# Patient Record
Sex: FEMALE | Race: WHITE | NOT HISPANIC OR LATINO | Employment: OTHER | URBAN - METROPOLITAN AREA
[De-identification: names, ages, dates, MRNs, and addresses within clinical notes are randomized per-mention and may not be internally consistent; named-entity substitution may affect disease eponyms.]

---

## 2017-05-31 ENCOUNTER — TELEPHONE (OUTPATIENT)
Dept: ORTHOPEDIC SURGERY | Facility: CLINIC | Age: 82
End: 2017-05-31

## 2017-07-06 ENCOUNTER — OFFICE VISIT (OUTPATIENT)
Dept: ORTHOPEDIC SURGERY | Facility: CLINIC | Age: 82
End: 2017-07-06

## 2017-07-06 VITALS — BODY MASS INDEX: 18.8 KG/M2 | WEIGHT: 117 LBS | TEMPERATURE: 97.4 F | HEIGHT: 66 IN

## 2017-07-06 DIAGNOSIS — M17.11 PRIMARY OSTEOARTHRITIS OF RIGHT KNEE: ICD-10-CM

## 2017-07-06 DIAGNOSIS — M25.561 CHRONIC PAIN OF RIGHT KNEE: Primary | ICD-10-CM

## 2017-07-06 DIAGNOSIS — G89.29 CHRONIC PAIN OF RIGHT KNEE: Primary | ICD-10-CM

## 2017-07-06 PROCEDURE — 73562 X-RAY EXAM OF KNEE 3: CPT | Performed by: ORTHOPAEDIC SURGERY

## 2017-07-06 PROCEDURE — 99214 OFFICE O/P EST MOD 30 MIN: CPT | Performed by: ORTHOPAEDIC SURGERY

## 2017-07-06 RX ORDER — CLONIDINE HYDROCHLORIDE 0.2 MG/1
0.2 TABLET ORAL NIGHTLY
Refills: 0 | COMMUNITY
Start: 2017-06-12 | End: 2023-03-21 | Stop reason: SDDI

## 2017-07-06 RX ORDER — LOPERAMIDE HYDROCHLORIDE 2 MG/1
2 TABLET ORAL 4 TIMES DAILY PRN
COMMUNITY

## 2017-07-06 RX ORDER — LOSARTAN POTASSIUM 50 MG/1
TABLET ORAL
Refills: 1 | COMMUNITY
Start: 2017-06-12 | End: 2018-08-14

## 2017-07-06 RX ORDER — OXYBUTYNIN CHLORIDE 10 MG/1
TABLET, EXTENDED RELEASE ORAL
Refills: 5 | COMMUNITY
Start: 2017-06-12 | End: 2018-08-14

## 2017-07-06 NOTE — PROGRESS NOTES
"Patient: Eneida Trevino  YOB: 1933 83 y.o. female  Medical Record Number: [id number]    Chief Complaints:   Chief Complaint   Patient presents with   • Right Knee - Follow-up       History of Present Illness:Eneida Trevino is a 83 y.o. female who presents with Right knee pain and instability which she rates as severe.  She has known advanced osteoarthritis and she dealt with for years.  She is undergone physical therapy and cortisone injections hip despite this has feelings of instability and pain which limit her basic activities of daily living.  She can walk only short distances.  Of note she had a bowel resection for cancer and she does have some bowel incontinence which gives her apprehension when considering surgery.    Allergies:   Allergies   Allergen Reactions   • Penicillins Other (See Comments)     UNKNOWN   • Sulfa Antibiotics Rash       Medications:   Current Outpatient Prescriptions   Medication Sig Dispense Refill   • CloNIDine (CATAPRES) 0.2 MG tablet TK 1 T PO QD  0   • loperamide (IMODIUM A-D) 2 MG tablet Take  by mouth.     • losartan (COZAAR) 50 MG tablet TK 1 T PO QD  1   • oxybutynin XL (DITROPAN-XL) 10 MG 24 hr tablet TK 1 T PO  BID  5     No current facility-administered medications for this visit.          The following portions of the patient's history were reviewed and updated as appropriate: allergies, current medications, past family history, past medical history, past social history, past surgical history and problem list.    Review of Systems:   A 14 point review of systems was performed. All systems negative except pertinent positives/negative listed in HPI above    Physical Exam:   Vitals:    07/06/17 1625   Temp: 97.4 °F (36.3 °C)   Weight: 117 lb (53.1 kg)   Height: 65.5\" (166.4 cm)       General: A and O x 3, ASA, NAD    SCLERA:    Normal    DENTITION:   Normal  Knee:  right    ALIGNMENT:     20 deg Valgus      GAIT:    Antalgic    SKIN:    No " abnormality    RANGE OF MOTION:   5  -  110   DEG    STRENGTH:   4  / 5    LIGAMENTS:    No varus / valgus instability.   Negative  Lachman.    MENISCUS:     Negative   Norma       DISTAL PULSES:    Paplable    DISTAL SENSATION :   Intact    LYMPHATICS:     No   lymphadenopathy    OTHER:          - Positive   effusion      - Crepitance with ROM        Radiology:  Xrays 3views right knee (ap,lateral, sunrise) were ordered and reviewed for evaluation of knee pain demonstrating advanced valgus osteoarthritis with bone on bone articulation, subchondral cysts, and periarticular osteophytes.  The films were compared to films from 2015 and they are essentially unchanged    Assessment/Plan: Right knee severe end-stage osteoarthritis she has marked mechanical alignment issues and at this point the next option would be total knee replacement.  She is going to see Dr. Sukh Vogel: Surgeon to discuss options about her incontinence.  Certainly if we did proceed with surgery I would want to have her discharged home rather than rehabilitation facility and we would use a yan dressing to protect her should she have any incontinence issues.      Carlos Richardson MD  7/6/2017

## 2017-08-23 ENCOUNTER — OFFICE (OUTPATIENT)
Dept: URBAN - METROPOLITAN AREA CLINIC 75 | Facility: CLINIC | Age: 82
End: 2017-08-23

## 2017-08-23 VITALS
HEIGHT: 65 IN | HEART RATE: 78 BPM | SYSTOLIC BLOOD PRESSURE: 180 MMHG | WEIGHT: 120 LBS | DIASTOLIC BLOOD PRESSURE: 80 MMHG

## 2017-08-23 DIAGNOSIS — R63.4 ABNORMAL WEIGHT LOSS: ICD-10-CM

## 2017-08-23 DIAGNOSIS — R19.4 CHANGE IN BOWEL HABIT: ICD-10-CM

## 2017-08-23 DIAGNOSIS — R14.3 FLATULENCE: ICD-10-CM

## 2017-08-23 DIAGNOSIS — R19.7 DIARRHEA, UNSPECIFIED: ICD-10-CM

## 2017-08-23 DIAGNOSIS — D37.8 NEOPLASM OF UNCERTAIN BEHAVIOR OF OTHER SPECIFIED DIGESTIVE: ICD-10-CM

## 2017-08-23 PROCEDURE — 99214 OFFICE O/P EST MOD 30 MIN: CPT

## 2018-07-19 ENCOUNTER — OFFICE VISIT (OUTPATIENT)
Dept: ORTHOPEDIC SURGERY | Facility: CLINIC | Age: 83
End: 2018-07-19

## 2018-07-19 VITALS — BODY MASS INDEX: 21.09 KG/M2 | HEIGHT: 63 IN | WEIGHT: 119 LBS | TEMPERATURE: 98.2 F

## 2018-07-19 DIAGNOSIS — M17.11 PRIMARY OSTEOARTHRITIS OF RIGHT KNEE: ICD-10-CM

## 2018-07-19 DIAGNOSIS — M25.561 RIGHT KNEE PAIN, UNSPECIFIED CHRONICITY: Primary | ICD-10-CM

## 2018-07-19 PROCEDURE — 99214 OFFICE O/P EST MOD 30 MIN: CPT | Performed by: ORTHOPAEDIC SURGERY

## 2018-07-19 PROCEDURE — 73562 X-RAY EXAM OF KNEE 3: CPT | Performed by: ORTHOPAEDIC SURGERY

## 2018-07-19 RX ORDER — MELOXICAM 15 MG/1
15 TABLET ORAL ONCE
Status: CANCELLED | OUTPATIENT
Start: 2018-08-27 | End: 2018-08-27

## 2018-07-19 RX ORDER — SOLIFENACIN SUCCINATE 5 MG/1
TABLET, FILM COATED ORAL DAILY
COMMUNITY
End: 2018-08-14

## 2018-07-19 RX ORDER — CLINDAMYCIN PHOSPHATE 900 MG/50ML
900 INJECTION INTRAVENOUS ONCE
Status: CANCELLED | OUTPATIENT
Start: 2018-08-27 | End: 2018-08-27

## 2018-07-19 NOTE — PROGRESS NOTES
"Patient: Eneida Trevino  YOB: 1933 84 y.o. female  Medical Record Number: [id number]    Chief Complaints:   Chief Complaint   Patient presents with   • Right Knee - Follow-up, Pain       History of Present Illness:Eneida Trevino is a 84 y.o. female who presents for follow-up of  Right knee pain.  She's having more pain recently the pain is beginning to limit her activities.  She has severe advanced valgus deformity with discussed knee replacement in the past but she held off for medical reasons.  She is now in better shape and is ready to proceed with right total knee replacement    Allergies:   Allergies   Allergen Reactions   • Cephalexin Itching   • Penicillins Other (See Comments)     UNKNOWN   • Sulfa Antibiotics Rash       Medications:   Current Outpatient Prescriptions   Medication Sig Dispense Refill   • CloNIDine (CATAPRES) 0.2 MG tablet TK 1 T PO QD  0   • loperamide (IMODIUM A-D) 2 MG tablet Take  by mouth.     • losartan (COZAAR) 50 MG tablet TK 1 T PO QD  1   • Probiotic Product (ALIGN PO) Take  by mouth.     • solifenacin (VESICARE) 5 MG tablet Take  by mouth Daily.     • oxybutynin XL (DITROPAN-XL) 10 MG 24 hr tablet TK 1 T PO  BID  5     No current facility-administered medications for this visit.          The following portions of the patient's history were reviewed and updated as appropriate: allergies, current medications, past family history, past medical history, past social history, past surgical history and problem list.    Review of Systems:   A 14 point review of systems was performed. All systems negative except pertinent positives/negative listed in HPI above    Physical Exam:   Vitals:    07/19/18 1452   Temp: 98.2 °F (36.8 °C)   TempSrc: Temporal Artery    Weight: 54 kg (119 lb)   Height: 158.8 cm (62.5\")       General: A and O x 3, ASA, NAD    SCLERA:    Normal    DENTITION:   Normal  Knee:  right    ALIGNMENT:     20 degrees Valgus      GAIT:    " Antalgic    SKIN:    No abnormality    RANGE OF MOTION:   0  -  120   DEG    STRENGTH:   4  / 5    LIGAMENTS:    No varus / valgus instability.   Negative  Lachman.    MENISCUS:     Negative   Norma       DISTAL PULSES:    Paplable    DISTAL SENSATION :   Intact    LYMPHATICS:     No   lymphadenopathy    OTHER:          - Positive   effusion      - Crepitance with ROM     Radiology:  Xrays 3views right knee (ap,lateral, sunrise) were ordered and reviewed for evaluation of knee pain demonstratingadvanced valgus osteoarthritis with bone on bone articulation, subchondral cysts, and periarticular osteophytes  todays xrays were compared to previous xrays and demonstrate no change    Assessment/Plan:  Severe advanced right knee valgus osteoarthritis.  She has addressed her bowel issues and overall feels that she is ready to proceed with knee replacement as we have discussed in the past.  She is a and extreme fixed valgus deformity which could require revision components or possibly an hinge components which would be available for the procedure.  Continuation of conservative management vs. TKA discussed.  The patient wishes to proceed with total knee replacement.  At this point the patient has failed the full compliment of conservative treatment and stating complete understanding of the risks/benefits/ anternatives wishes to proceed with surgical treatment.    Risk and benefits of surgery were reviewed.  Including, but not limited to, blood clots or pulmonary embolism, anesthesia risk, infection, fracture, skin/leg numbness, persistent pain/crepitance/popping/catching, failure of the implant, need for future surgeries, hematoma, possible nerve or blood vessel injury, need for transfusion, and potential risk of stroke,heart attack or death, among others.  The patient understands and wishes to proceed.     It was explained that if tissue has been repaired or reconstructed, there is also an increased chance of failure  which may require further management.  Following the completion of the discussion, the patient expressed understanding of this planned course of care, all their questions were answered and consent will be obtained preoperatively.    Operative Plan: right knee [facility] Total Knee Replacement a 3 day staywith inpatient rehab  - [facility]

## 2018-07-25 ENCOUNTER — HOSPITAL ENCOUNTER (OUTPATIENT)
Dept: PHYSICAL THERAPY | Facility: HOSPITAL | Age: 83
Setting detail: THERAPIES SERIES
Discharge: HOME | End: 2018-07-25
Attending: ORTHOPAEDIC SURGERY

## 2018-07-25 DIAGNOSIS — M25.561 ACUTE PAIN OF RIGHT KNEE: Primary | ICD-10-CM

## 2018-07-25 PROCEDURE — G8979 MOBILITY GOAL STATUS: HCPCS | Performed by: PHYSICAL THERAPIST

## 2018-07-25 PROCEDURE — 97110 THERAPEUTIC EXERCISES: CPT | Performed by: PHYSICAL THERAPIST

## 2018-07-25 PROCEDURE — G8978 MOBILITY CURRENT STATUS: HCPCS | Performed by: PHYSICAL THERAPIST

## 2018-07-25 PROCEDURE — 97162 PT EVAL MOD COMPLEX 30 MIN: CPT | Performed by: PHYSICAL THERAPIST

## 2018-07-26 NOTE — THERAPY EVALUATION
Outpatient Physical Therapy Ortho Initial Evaluation  [facility]     Patient Name: Eneida Trevino  : 1933  MRN: [id number]  Today's Date: 2018      Visit Date: 2018    There is no problem list on file for this patient.       Past Medical History:   Diagnosis Date   • Carcinoid tumor of gastrointestinal tract         Past Surgical History:   Procedure Laterality Date   • FOOT SURGERY Bilateral    • HYSTERECTOMY     • KIDNEY STONE SURGERY         Visit Dx:     ICD-10-CM ICD-9-CM   1. Acute pain of right knee M25.561 719.46             Patient History     Row Name 18 07             History    Chief Complaint Difficulty Walking;Difficulty with daily activities  -      Date Current Problem(s) Began 18  -      Brief Description of Current Complaint 83 y/o female with progressive hx of (R) OA knee resulting in 5/ 10 pain poor tolerance with prolonged ambulation > 10 mins  -         Pain     Pain Location --   (R) knee pain 5/ 10  -      Tolerance Time- Standing 10 mins  -      Tolerance Time- Walking 10 mins  -        User Key  (r) = Recorded By, (t) = Taken By, (c) = Cosigned By    Initials Name Provider Type     Stan Velasquez, PT Physical Therapist                PT Ortho     Row Name 18 07       Subjective Comments    Subjective Comments --   difficutly with ADL's  -       Subjective Pain    Able to rate subjective pain? yes  -    Pre-Treatment Pain Level 5  -DH       Posture/Observations    Posture/Observations Comments --   (+) valgus deformity > 20 degrees (R) knee  -       Quarter Clearing    Quarter Clearing --   all dermatomes are intact  -       Special Tests/Palpation    Special Tests/Palpation --   severe ttp (R) ant/ med joint line  -       General ROM    GENERAL ROM COMMENTS --   (R) knee AAROM 0/0/ 125  -       MMT (Manual Muscle Testing)    Additional Documentation --   (R) quad 4-/5 (B) glute medius 4-/5  -DH        Transfers    Comment (Transfers) --   pain with sit-stand   -       Gait/Stairs Assessment/Training    Pattern (Gait) --   (+) antalgia with gait/ encouraged use of spc  -      User Key  (r) = Recorded By, (t) = Taken By, (c) = Cosigned By    Initials Name Provider Type     Stan Velasquez, PT Physical Therapist                 Pt is a 83 y/o female presenting to PT with s/s consistent with progressively acute (R) knee OA/ pain;  (+) valgus deformity is noted with gait.  encouraged use of spc;  (R) knee AAROM 0/0/ 125;severe ttp (R) ant/ med joint line knee; decreased (R) quad/ (B) glute medius MMT 4-/5;  10 min ambulation tolerance. 72% LE disability score.  pt is a good candidate for skilled PT prognosis for this patient is good.                    PT OP Goals     Row Name 07/26/18 0700          PT Short Term Goals    STG Date to Achieve 08/08/18  -     STG 1 pt to be indepedent [facility] prehabilitation program. 2 wks.   -     STG 2 decreased ant/ med joint line ttp from severe to minimal to alllow for increased ease with sit-stand transfer. 2 wks.   -        Long Term Goals    LTG 1 pt to demonstrate correct gait sequence with use of spc. 4 wks.   -     LTG 2 increased LE MMT 4/5 to allow for increased tolerance with prolonged ambulation > 20 mins without increased symptoms of knee pain > 3/ 10 consistently. 4 wks.   -        Time Calculation    PT Goal Re-Cert Due Date 08/25/18  -       User Key  (r) = Recorded By, (t) = Taken By, (c) = Cosigned By    Initials Name Provider Type     Stan Velasquez, PT Physical Therapist                      Exercises     Row Name 07/26/18 0700             Subjective Comments    Subjective Comments --   difficutly with ADL's  -         Subjective Pain    Able to rate subjective pain? yes  -      Pre-Treatment Pain Level 5  -         Total Minutes    43571 - PT Therapeutic Exercise Minutes 20  -DH         Exercise 1    Exercise Name 1 see flow sheet  -       Cueing 1 Verbal;Tactile  -        User Key  (r) = Recorded By, (t) = Taken By, (c) = Cosigned By    Initials Name Provider Type     Stan Velasquez, PT Physical Therapist                        Outcome Measure Options:  (LE fx score 74%)         Time Calculation:     Therapy Suggested Charges     Code   Minutes Charges    29487 (CPT®) Hc Pt Neuromusc Re Education Ea 15 Min      90168 (CPT®) Hc Pt Ther Proc Ea 15 Min 20 1    26166 (CPT®) Hc Gait Training Ea 15 Min      31299 (CPT®) Hc Pt Therapeutic Act Ea 15 Min      21885 (CPT®) Hc Pt Manual Therapy Ea 15 Min      81890 (CPT®) Hc Pt Ther Massage- Per 15 Min      41353 (CPT®) Hc Pt Iontophoresis Ea 15 Min      05855 (CPT®) Hc Pt Elec Stim Ea-Per 15 Min      75618 (CPT®) Hc Pt Ultrasound Ea 15 Min      58664 (CPT®) Hc Pt Self Care/Mgmt/Train Ea 15 Min      Total  20 1          Start Time: 1015  Stop Time: 1100  Time Calculation (min): 45 min     Therapy Charges for Today     Code Description Service Date Service Provider Modifiers Qty    24509096710 HC PT MOBILITY CURRENT 7/25/2018 Stan Velasquez, PT GP, CL 1    18837855929 HC PT MOBILITY PROJECTED 7/25/2018 Stan Velasquez, PT GP, CK 1    16867940105 HC PT THER PROC EA 15 MIN 7/25/2018 Stan Velasquez, PT GP 1    13661428656 HC PT EVAL MOD COMPLEXITY 2 7/25/2018 Stan Velasquez, PT GP 1          PT G-Codes  Outcome Measure Options:  (LE fx score 74%)  Score: LE fx score 74%  Functional Limitation: Mobility: Walking and moving around  Mobility: Walking and Moving Around Current Status (): At least 60 percent but less than 80 percent impaired, limited or restricted  Mobility: Walking and Moving Around Goal Status (): At least 40 percent but less than 60 percent impaired, limited or restricted         Stan Velasquez, PT  7/26/2018

## 2018-07-27 ENCOUNTER — HOSPITAL ENCOUNTER (OUTPATIENT)
Dept: PHYSICAL THERAPY | Facility: HOSPITAL | Age: 83
Setting detail: THERAPIES SERIES
Discharge: HOME | End: 2018-07-27

## 2018-07-27 PROCEDURE — 97110 THERAPEUTIC EXERCISES: CPT | Performed by: PHYSICAL THERAPIST

## 2018-07-27 NOTE — THERAPY TREATMENT NOTE
Outpatient Physical Therapy Ortho Treatment Note  [facility]     Patient Name: Eneida Trevino  : 1933  MRN: [id number]  Today's Date: 2018      Visit Date: 2018    Visit Dx:  No diagnosis found.    There is no problem list on file for this patient.       Past Medical History:   Diagnosis Date   • Carcinoid tumor of gastrointestinal tract         Past Surgical History:   Procedure Laterality Date   • FOOT SURGERY Bilateral    • HYSTERECTOMY     • KIDNEY STONE SURGERY               PT Ortho     Row Name 18 0700       Subjective Comments    Subjective Comments --   difficutly with ADL's  -       Subjective Pain    Able to rate subjective pain? yes  -    Pre-Treatment Pain Level 5  -       Posture/Observations    Posture/Observations Comments --   (+) valgus deformity > 20 degrees (R) knee  -       Quarter Clearing    Quarter Clearing --   all dermatomes are intact  -       Special Tests/Palpation    Special Tests/Palpation --   severe ttp (R) ant/ med joint line  -       General ROM    GENERAL ROM COMMENTS --   (R) knee AAROM 0/0/ 125  -       MMT (Manual Muscle Testing)    Additional Documentation --   (R) quad 4-/5 (B) glute medius 4-/5  -       Transfers    Comment (Transfers) --   pain with sit-stand   -       Gait/Stairs Assessment/Training    Pattern (Gait) --   (+) antalgia with gait/ encouraged use of spc  -      User Key  (r) = Recorded By, (t) = Taken By, (c) = Cosigned By    Initials Name Provider Type     Stan Velasquez, PT Physical Therapist                            PT Assessment/Plan     Row Name 18 1112          PT Assessment    Assessment Comments reviewed progressed HEP;  mild cuing with HEP; no pain with AAROM/ AROM/ LE strengthening;  encouraged with tolerance.  provided independent with HEP dc next visit.   -     Please refer to paper survey for additional self-reported information Yes  -     Rehab Potential Good  -      Patient/caregiver participated in establishment of treatment plan and goals Yes  -DH       User Key  (r) = Recorded By, (t) = Taken By, (c) = Cosigned By    Initials Name Provider Type    LINDSEY Velasquez PT Physical Therapist                    Exercises     Row Name 07/27/18 1100             Subjective Comments    Subjective Comments questions about HEP  -DH         Subjective Pain    Able to rate subjective pain? yes  -DH      Pre-Treatment Pain Level 4  -DH      Post-Treatment Pain Level 3  -DH         Total Minutes    25432 - PT Therapeutic Exercise Minutes 40  -DH         Exercise 1    Exercise Name 1 see flow sheet  -DH      Cueing 1 Verbal;Tactile  -DH        User Key  (r) = Recorded By, (t) = Taken By, (c) = Cosigned By    Initials Name Provider Type    LINDSEY Velasquez PT Physical Therapist                                            Time Calculation:   Start Time: 0930  Stop Time: 1015  Time Calculation (min): 45 min  Therapy Suggested Charges     Code   Minutes Charges    64028 (CPT®) Hc Pt Neuromusc Re Education Ea 15 Min      27394 (CPT®) Hc Pt Ther Proc Ea 15 Min 40 3    14214 (CPT®) Hc Gait Training Ea 15 Min      95666 (CPT®) Hc Pt Therapeutic Act Ea 15 Min      54628 (CPT®) Hc Pt Manual Therapy Ea 15 Min      16859 (CPT®) Hc Pt Ther Massage- Per 15 Min      24164 (CPT®) Hc Pt Iontophoresis Ea 15 Min      27458 (CPT®) Hc Pt Elec Stim Ea-Per 15 Min      02000 (CPT®) Hc Pt Ultrasound Ea 15 Min      54407 (CPT®) Hc Pt Self Care/Mgmt/Train Ea 15 Min      Total  40 3        Therapy Charges for Today     Code Description Service Date Service Provider Modifiers Qty    74516208651 HC PT THER PROC EA 15 MIN 7/27/2018 Stan Velasquez, PT GP 3                    Stan Velasquez, PT  7/27/2018

## 2018-07-31 PROBLEM — M17.11 PRIMARY OSTEOARTHRITIS OF RIGHT KNEE: Status: ACTIVE | Noted: 2018-07-31

## 2018-08-01 ENCOUNTER — HOSPITAL ENCOUNTER (OUTPATIENT)
Dept: PHYSICAL THERAPY | Facility: HOSPITAL | Age: 83
Setting detail: THERAPIES SERIES
Discharge: HOME | End: 2018-08-01

## 2018-08-01 DIAGNOSIS — M25.561 ACUTE PAIN OF RIGHT KNEE: Primary | ICD-10-CM

## 2018-08-01 PROCEDURE — G8980 MOBILITY D/C STATUS: HCPCS | Performed by: PHYSICAL THERAPIST

## 2018-08-01 PROCEDURE — G8979 MOBILITY GOAL STATUS: HCPCS | Performed by: PHYSICAL THERAPIST

## 2018-08-02 PROCEDURE — 97110 THERAPEUTIC EXERCISES: CPT | Performed by: PHYSICAL THERAPIST

## 2018-08-02 NOTE — THERAPY DISCHARGE NOTE
Outpatient Physical Therapy Ortho Treatment Note/Discharge Summary  [facility]     Patient Name: Eneida Trevino  : 1933  MRN: [id number]  Today's Date: 2018      Visit Date: 2018    Visit Dx:    ICD-10-CM ICD-9-CM   1. Acute pain of right knee M25.561 719.46       Patient Active Problem List   Diagnosis   • Primary osteoarthritis of right knee        Past Medical History:   Diagnosis Date   • Carcinoid tumor of gastrointestinal tract         Past Surgical History:   Procedure Laterality Date   • FOOT SURGERY Bilateral    • HYSTERECTOMY     • KIDNEY STONE SURGERY                  all goals met/ pt independent with progressed HEP;  values PT compliance with HEP;  appropriate for discharge at this time. pt agrees with POC and is satisfied with all intervention provided.               PT Assessment/Plan     Row Name 18 0748          PT Assessment    Assessment Comments all goals met/ pt independent with progressed HEP;  values PT compliance with HEP;  appropriate for discharge at this time. pt agrees with POC and is satisfied with all intervention provided.   -     Please refer to paper survey for additional self-reported information Yes  -     Rehab Potential Good  -     Patient/caregiver participated in establishment of treatment plan and goals Yes  -     Patient would benefit from skilled therapy intervention Yes  -       User Key  (r) = Recorded By, (t) = Taken By, (c) = Cosigned By    Initials Name Provider Type    Stan Knight PT Physical Therapist                    Exercises     Row Name 18 0700             Subjective Comments    Subjective Comments --   questions about HEP/ POC  -         Subjective Pain    Able to rate subjective pain? yes  -DH      Pre-Treatment Pain Level 1  -DH      Post-Treatment Pain Level 1  -DH         Total Minutes    64631 - PT Therapeutic Exercise Minutes 40  -DH         Exercise 1    Exercise Name 1 reassessment/ edu  POC/ HEP  -        User Key  (r) = Recorded By, (t) = Taken By, (c) = Cosigned By    Initials Name Provider Type     Stan Velasquez, PT Physical Therapist                               PT OP Goals     Row Name 08/02/18 0700          PT Short Term Goals    STG Date to Achieve 08/08/18  -     STG 1 pt to be indepedent [facility] prehabilitation program. 2 wks.   -     STG 1 Progress Met  -     STG 2 decreased ant/ med joint line ttp from severe to minimal to alllow for increased ease with sit-stand transfer. 2 wks.   -     STG 2 Progress Met  -        Long Term Goals    LTG 1 pt to demonstrate correct gait sequence with use of spc. 4 wks.   -     LTG 1 Progress Met  -     LTG 2 increased LE MMT 4/5 to allow for increased tolerance with prolonged ambulation > 20 mins without increased symptoms of knee pain > 3/ 10 consistently. 4 wks.   -     LTG 2 Progress Ongoing  -       User Key  (r) = Recorded By, (t) = Taken By, (c) = Cosigned By    Initials Name Provider Type     Stan Velasquez, PT Physical Therapist                         Time Calculation:   Start Time: 1500  Stop Time: 1545  Time Calculation (min): 45 min  Therapy Suggested Charges     Code   Minutes Charges    14484 (CPT®) Hc Pt Neuromusc Re Education Ea 15 Min      87427 (CPT®) Hc Pt Ther Proc Ea 15 Min 40 3    82082 (CPT®) Hc Gait Training Ea 15 Min      26191 (CPT®) Hc Pt Therapeutic Act Ea 15 Min      63993 (CPT®) Hc Pt Manual Therapy Ea 15 Min      10727 (CPT®) Hc Pt Ther Massage- Per 15 Min      95411 (CPT®) Hc Pt Iontophoresis Ea 15 Min      38245 (CPT®) Hc Pt Elec Stim Ea-Per 15 Min      06200 (CPT®) Hc Pt Ultrasound Ea 15 Min      08196 (CPT®) Hc Pt Self Care/Mgmt/Train Ea 15 Min      Total  40 3        Therapy Charges for Today     Code Description Service Date Service Provider Modifiers Qty    09987392849 HC PT MOBILITY PROJECTED 8/1/2018 Stan Velasquez, PT GP, CK 1    11014714642 HC PT MOBILITY DISCHARGE 8/1/2018 Ron  Stan LEAL, PT GP, CK 1    [id number]  PT THER PROC EA 15 MIN 8/2/2018 Stan Velasquez, PT GP 3          PT G-Codes  PT Professional Judgement Used?: Yes  Functional Limitation: Mobility: Walking and moving around  Mobility: Walking and Moving Around Goal Status (): At least 40 percent but less than 60 percent impaired, limited or restricted  Mobility: Walking and Moving Around Discharge Status (): At least 40 percent but less than 60 percent impaired, limited or restricted            Stan Velasquez, PT  8/2/2018

## 2018-08-10 ENCOUNTER — PREP FOR SURGERY (OUTPATIENT)
Dept: OTHER | Facility: HOSPITAL | Age: 83
End: 2018-08-10

## 2018-08-10 DIAGNOSIS — M17.11 PRIMARY OSTEOARTHRITIS OF RIGHT KNEE: Primary | ICD-10-CM

## 2018-08-14 ENCOUNTER — TELEPHONE (OUTPATIENT)
Dept: ORTHOPEDIC SURGERY | Facility: CLINIC | Age: 83
End: 2018-08-14

## 2018-08-14 ENCOUNTER — APPOINTMENT (OUTPATIENT)
Dept: PREADMISSION TESTING | Facility: HOSPITAL | Age: 83
End: 2018-08-14

## 2018-08-14 VITALS
DIASTOLIC BLOOD PRESSURE: 74 MMHG | SYSTOLIC BLOOD PRESSURE: 156 MMHG | BODY MASS INDEX: 20.98 KG/M2 | OXYGEN SATURATION: 99 % | WEIGHT: 118.4 LBS | RESPIRATION RATE: 16 BRPM | HEIGHT: 63 IN | TEMPERATURE: 97.7 F | HEART RATE: 63 BPM

## 2018-08-14 DIAGNOSIS — N39.0 ACUTE UTI (URINARY TRACT INFECTION): Primary | ICD-10-CM

## 2018-08-14 DIAGNOSIS — M17.11 PRIMARY OSTEOARTHRITIS OF RIGHT KNEE: ICD-10-CM

## 2018-08-14 LAB
ANION GAP SERPL CALCULATED.3IONS-SCNC: 9.6 MMOL/L
BACTERIA UR QL AUTO: ABNORMAL /HPF
BILIRUB UR QL STRIP: NEGATIVE
BUN BLD-MCNC: 11 MG/DL (ref 8–23)
BUN/CREAT SERPL: 21.2 (ref 7–25)
CALCIUM SPEC-SCNC: 9.1 MG/DL (ref 8.6–10.5)
CHLORIDE SERPL-SCNC: 106 MMOL/L (ref 98–107)
CLARITY UR: CLEAR
CO2 SERPL-SCNC: 27.4 MMOL/L (ref 22–29)
COLOR UR: YELLOW
CREAT BLD-MCNC: 0.52 MG/DL (ref 0.57–1)
DEPRECATED RDW RBC AUTO: 45.1 FL (ref 37–54)
ERYTHROCYTE [DISTWIDTH] IN BLOOD BY AUTOMATED COUNT: 13.8 % (ref 11.7–13)
GFR SERPL CREATININE-BSD FRML MDRD: 112 ML/MIN/1.73
GLUCOSE BLD-MCNC: 79 MG/DL (ref 65–99)
GLUCOSE UR STRIP-MCNC: NEGATIVE MG/DL
HCT VFR BLD AUTO: 39.2 % (ref 35.6–45.5)
HGB BLD-MCNC: 12.7 G/DL (ref 11.9–15.5)
HGB UR QL STRIP.AUTO: NEGATIVE
HYALINE CASTS UR QL AUTO: ABNORMAL /LPF
KETONES UR QL STRIP: NEGATIVE
LEUKOCYTE ESTERASE UR QL STRIP.AUTO: ABNORMAL
MCH RBC QN AUTO: 28.7 PG (ref 26.9–32)
MCHC RBC AUTO-ENTMCNC: 32.4 G/DL (ref 32.4–36.3)
MCV RBC AUTO: 88.7 FL (ref 80.5–98.2)
NITRITE UR QL STRIP: POSITIVE
PH UR STRIP.AUTO: 6 [PH] (ref 5–8)
PLATELET # BLD AUTO: 200 10*3/MM3 (ref 140–500)
PMV BLD AUTO: 9.5 FL (ref 6–12)
POTASSIUM BLD-SCNC: 3.9 MMOL/L (ref 3.5–5.2)
PROT UR QL STRIP: NEGATIVE
RBC # BLD AUTO: 4.42 10*6/MM3 (ref 3.9–5.2)
RBC # UR: ABNORMAL /HPF
REF LAB TEST METHOD: ABNORMAL
SODIUM BLD-SCNC: 143 MMOL/L (ref 136–145)
SP GR UR STRIP: 1.01 (ref 1–1.03)
SQUAMOUS #/AREA URNS HPF: ABNORMAL /HPF
UROBILINOGEN UR QL STRIP: ABNORMAL
WBC NRBC COR # BLD: 4.77 10*3/MM3 (ref 4.5–10.7)
WBC UR QL AUTO: ABNORMAL /HPF

## 2018-08-14 PROCEDURE — 85027 COMPLETE CBC AUTOMATED: CPT | Performed by: ORTHOPAEDIC SURGERY

## 2018-08-14 PROCEDURE — 81001 URINALYSIS AUTO W/SCOPE: CPT | Performed by: ORTHOPAEDIC SURGERY

## 2018-08-14 PROCEDURE — 80048 BASIC METABOLIC PNL TOTAL CA: CPT | Performed by: ORTHOPAEDIC SURGERY

## 2018-08-14 PROCEDURE — 36415 COLL VENOUS BLD VENIPUNCTURE: CPT

## 2018-08-14 PROCEDURE — 87077 CULTURE AEROBIC IDENTIFY: CPT | Performed by: ORTHOPAEDIC SURGERY

## 2018-08-14 PROCEDURE — 87186 SC STD MICRODIL/AGAR DIL: CPT | Performed by: ORTHOPAEDIC SURGERY

## 2018-08-14 PROCEDURE — 87086 URINE CULTURE/COLONY COUNT: CPT | Performed by: ORTHOPAEDIC SURGERY

## 2018-08-14 PROCEDURE — 93005 ELECTROCARDIOGRAM TRACING: CPT

## 2018-08-14 PROCEDURE — 93010 ELECTROCARDIOGRAM REPORT: CPT | Performed by: INTERNAL MEDICINE

## 2018-08-14 RX ORDER — CHLORHEXIDINE GLUCONATE 500 MG/1
CLOTH TOPICAL
COMMUNITY
End: 2023-03-21 | Stop reason: SDDI

## 2018-08-14 RX ORDER — OXYBUTYNIN CHLORIDE 5 MG/1
5 TABLET ORAL AS NEEDED
COMMUNITY
End: 2023-03-21 | Stop reason: SDDI

## 2018-08-14 RX ORDER — LOSARTAN POTASSIUM 50 MG/1
100 TABLET ORAL DAILY
COMMUNITY

## 2018-08-14 RX ORDER — CIPROFLOXACIN 500 MG/1
500 TABLET, FILM COATED ORAL EVERY 12 HOURS SCHEDULED
Qty: 14 TABLET | Refills: 0 | Status: SHIPPED | OUTPATIENT
Start: 2018-08-14

## 2018-08-14 RX ORDER — CLONIDINE HYDROCHLORIDE 0.2 MG/1
0.2 TABLET ORAL NIGHTLY
COMMUNITY

## 2018-08-14 ASSESSMENT — KOOS JR
KOOS JR SCORE: 13
KOOS JR SCORE: 54.84

## 2018-08-14 NOTE — TELEPHONE ENCOUNTER
----- Message from SUZY Godoy sent at 8/14/2018  4:11 PM EDT -----  Would not recommend that we wait for sensitivities before treating.  Delaying treatment would put the patient at risk for developing pylonephritis.  Please ask that the patient get started with antibiotic as soon as possible and if we need to change antibiotic we always can once sensitivities are back

## 2018-08-14 NOTE — DISCHARGE INSTRUCTIONS
Take the following medications the morning of surgery with a small sip of water:    LOSARTAN    General Instructions:  • Do not eat solid food after midnight the night before surgery.  • You may drink clear liquids day of surgery but must stop at least one hour before your hospital arrival time.  • It is beneficial for you to have a clear drink that contains carbohydrates the day of surgery.  We suggest a 12 to 20 ounce bottle of Gatorade or Powerade for non-diabetic patients or a 12 to 20 ounce bottle of G2 or Powerade Zero for diabetic patients. (Pediatric patients, are not advised to drink a 12 to 20 ounce carbohydrate drink)    Clear liquids are liquids you can see through.  Nothing red in color.     Plain water                               Sports drinks  Sodas                                   Gelatin (Jell-O)  Fruit juices without pulp such as white grape juice and apple juice  Popsicles that contain no fruit or yogurt  Tea or coffee (no cream or milk added)  Gatorade / Powerade  G2 / Powerade Zero    • Infants may have breast milk up to four hours before surgery.  • Infants drinking formula may drink formula up to six hours before surgery.   • Patients who avoid smoking, chewing tobacco and alcohol for 4 weeks prior to surgery have a reduced risk of post-operative complications.  Quit smoking as many days before surgery as you can.  • Do not smoke, use chewing tobacco or drink alcohol the day of surgery.   • If applicable bring your C-PAP/ BI-PAP machine.  • Bring any papers given to you in the doctor’s office.  • Wear clean comfortable clothes and socks.  • Do not wear contact lenses or make-up.  Bring a case for your glasses.   • Bring crutches or walker if applicable.  • Remove all piercings.  Leave jewelry and any other valuables at home.  • Hair extensions with metal clips must be removed prior to surgery.  • The Pre-Admission Testing nurse will instruct you to bring medications if unable to obtain an  accurate list in Pre-Admission Testing.        If you were given a blood bank ID arm band remember to bring it with you the day of surgery.    Preventing a Surgical Site Infection:  • For 2 to 3 days before surgery, avoid shaving with a razor because the razor can irritate skin and make it easier to develop an infection.    • Any areas of open skin can increase the risk of a post-operative wound infection by allowing bacteria to enter and travel throughout the body.  Notify your surgeon if you have any skin wounds / rashes even if it is not near the expected surgical site.  The area will need assessed to determine if surgery should be delayed until it is healed.  • The night prior to surgery sleep in a clean bed with clean clothing.  Do not allow pets to sleep with you.  • Shower on the morning of surgery using a fresh bar of anti-bacterial soap (such as Dial) and clean washcloth.  Dry with a clean towel and dress in clean clothing.  • Ask your surgeon if you will be receiving antibiotics prior to surgery.  • Make sure you, your family, and all healthcare providers clean their hands with soap and water or an alcohol based hand  before caring for you or your wound.    Day of surgery:  Upon arrival, a Pre-op nurse and Anesthesiologist will review your health history, obtain vital signs, and answer questions you may have.  The only belongings needed at this time will be your home medications and if applicable your C-PAP/BI-PAP machine.  If you are staying overnight your family can leave the rest of your belongings in the car and bring them to your room later.  A Pre-op nurse will start an IV and you may receive medication in preparation for surgery, including something to help you relax.  Your family will be able to see you in the Pre-op area.  While you are in surgery your family should notify the waiting room  if they leave the waiting room area and provide a contact phone number.    Please be  aware that surgery does come with discomfort.  We want to make every effort to control your discomfort so please discuss any uncontrolled symptoms with your nurse.   Your doctor will most likely have prescribed pain medications.      If you are going home after surgery you will receive individualized written care instructions before being discharged.  A responsible adult must drive you to and from the hospital on the day of your surgery and stay with you for 24 hours.    If you are staying overnight following surgery, you will be transported to your hospital room following the recovery period.  [facility]    You have received a list of surgical assistants for your reference.  If you have any questions please call Pre-Admission Testing at [phone].  Deductibles and co-payments are collected on the day of service. Please be prepared to pay the required co-pay, deductible or deposit on the day of service as defined by your plan.    2% CHLORAHEXIDINE GLUCONATE* CLOTH  Preparing or “prepping” skin before surgery can reduce the risk of infection at the surgical site. To make the process easier, [facility] has chosen disposable cloths moistened with a rinse-free, 2% Chlorhexidine Gluconate (CHG) antiseptic solution. The steps below outline the prepping process and should be carefully followed.        Use the prep cloth on the area that is circled in the diagram             Directions Night before Surgery  1) Shower using a fresh bar of anti-bacterial soap (such as Dial) and clean washcloth.  Use a clean towel to completely dry your skin.  2) Do not use any lotions, oils or creams on your skin.  3) Open the package and remove 1 cloth, wipe your skin for 30 seconds in a circular motion.  Allow to dry for 3 minutes.  4) Repeat #3 with second cloth.  5) Do not touch your eyes, ears, or mouth with the prep cloth.  6) Allow the wet prep solution to air dry.  7) Discard the  prep cloth and wash your hands with soap and water.   8) Dress in clean bed clothes and sleep on fresh clean bed sheets.   9) You may experience some temporary itching after the prep.    Directions Day of Surgery  1) Repeat steps 1,2,3,4,5,6,7, and 9.   2) Dress in clean clothes before coming to the hospital.    BACTROBAN NASAL OINTMENT  There are many germs normally in your nose. Bactroban is an ointment that will help reduce these germs. Please follow these instructions for Bactroban use:      ____The day before surgery in the morning  Date________    ____The day before surgery in the evening              Date________    ____The day of surgery in the morning    Date________    **Squirt ½ package of Bactroban Ointment onto a cotton applicator and apply to inside of 1st nostril.  Squirt the remaining Bactroban and apply to the inside of the other nostril.      .

## 2018-08-14 NOTE — TELEPHONE ENCOUNTER
In review of her preadmission testing patient has an abnormal urinalysis.  Urine is still pending.  Have E prescribed a new prescription to [facility] at [phone] for Cipro 500 mg, #14, directions her to take 1 by mouth twice a day per mL L.  I was unable to reach the patient however I did leave her a message on her answering machine with all this information

## 2018-08-16 LAB — BACTERIA SPEC AEROBE CULT: ABNORMAL

## 2019-02-06 ENCOUNTER — DOCUMENTATION (OUTPATIENT)
Dept: PHYSICAL THERAPY | Facility: HOSPITAL | Age: 84
End: 2019-02-06

## 2021-03-02 DIAGNOSIS — Z23 IMMUNIZATION DUE: ICD-10-CM

## 2022-03-29 ENCOUNTER — HOME HEALTH ADMISSION (OUTPATIENT)
Dept: HOME HEALTH SERVICES | Facility: HOME HEALTHCARE | Age: 87
End: 2022-03-29

## 2022-03-29 ENCOUNTER — OFFICE VISIT (OUTPATIENT)
Dept: ORTHOPEDIC SURGERY | Facility: CLINIC | Age: 87
End: 2022-03-29

## 2022-03-29 VITALS — BODY MASS INDEX: 20.49 KG/M2 | WEIGHT: 120 LBS | HEIGHT: 64 IN | TEMPERATURE: 97.1 F

## 2022-03-29 DIAGNOSIS — M17.11 PRIMARY OSTEOARTHRITIS OF RIGHT KNEE: Primary | ICD-10-CM

## 2022-03-29 PROCEDURE — 73562 X-RAY EXAM OF KNEE 3: CPT | Performed by: NURSE PRACTITIONER

## 2022-03-29 PROCEDURE — 20610 DRAIN/INJ JOINT/BURSA W/O US: CPT | Performed by: NURSE PRACTITIONER

## 2022-03-29 PROCEDURE — 99203 OFFICE O/P NEW LOW 30 MIN: CPT | Performed by: NURSE PRACTITIONER

## 2022-03-29 RX ORDER — METHYLPREDNISOLONE ACETATE 80 MG/ML
80 INJECTION, SUSPENSION INTRA-ARTICULAR; INTRALESIONAL; INTRAMUSCULAR; SOFT TISSUE
Status: COMPLETED | OUTPATIENT
Start: 2022-03-29 | End: 2022-03-29

## 2022-03-29 RX ORDER — LIDOCAINE HYDROCHLORIDE 20 MG/ML
2 INJECTION, SOLUTION EPIDURAL; INFILTRATION; INTRACAUDAL; PERINEURAL
Status: COMPLETED | OUTPATIENT
Start: 2022-03-29 | End: 2022-03-29

## 2022-03-29 RX ADMIN — LIDOCAINE HYDROCHLORIDE 2 ML: 20 INJECTION, SOLUTION EPIDURAL; INFILTRATION; INTRACAUDAL; PERINEURAL at 14:21

## 2022-03-29 RX ADMIN — METHYLPREDNISOLONE ACETATE 80 MG: 80 INJECTION, SUSPENSION INTRA-ARTICULAR; INTRALESIONAL; INTRAMUSCULAR; SOFT TISSUE at 14:21

## 2022-03-29 NOTE — PROGRESS NOTES
Patient: Eneida Trevino  YOB: 1933 88 y.o. female  Medical Record Number: [id number]    Chief Complaints:   Chief Complaint   Patient presents with   • Right Knee - Initial Evaluation, Pain       History of Present Illness:Eneida Trevino is a 88 y.o. female who presents as a new patient both myself as well as to the practice with complaints of right knee pain.  She was actually seen by Dr. Richardson but is been well over 3 years.  She has significant deformity of the right knee secondary to end-stage osteoarthritis and even years ago she spoke with Dr. Richardson and they decided not to proceed with surgery because of the severe misalignment of her knee.  She is here today to see if there are any additional options.    Allergies:   Allergies   Allergen Reactions   • Cephalexin Itching   • Penicillins Other (See Comments)     UNKNOWN   • Sulfa Antibiotics Rash       Medications:   Current Outpatient Medications   Medication Sig Dispense Refill   • Chlorhexidine Gluconate Cloth 2 % pads Apply  topically. PER MD INSTRUCTIONS     • ciprofloxacin (CIPRO) 500 MG tablet Take 1 tablet by mouth Every 12 (Twelve) Hours. 14 tablet 0   • CloNIDine (CATAPRES) 0.2 MG tablet TK 1 T PO QD  0   • CloNIDine (CATAPRES) 0.2 MG tablet Take 0.2 mg by mouth Every Night. TAKES 2 TABLETS     • loperamide (IMODIUM A-D) 2 MG tablet Take 2 mg by mouth 4 (Four) Times a Day As Needed.     • losartan (COZAAR) 50 MG tablet Take 100 mg by mouth Daily.     • mupirocin (BACTROBAN) 2 % nasal ointment into the nostril(s) as directed by provider. PER MD INSTRUCTIONS     • oxybutynin (DITROPAN) 5 MG tablet Take 5 mg by mouth As Needed.     • Probiotic Product (ALIGN PO) Take 1 tablet by mouth Daily.       No current facility-administered medications for this visit.         The following portions of the patient's history were reviewed and updated as appropriate: allergies, current medications, past family history, past medical history,  "past social history, past surgical history and problem list.    Review of Systems:   A 14 point review of systems was performed. All systems negative except pertinent positives/negative listed in HPI above    Physical Exam:   Vitals:    03/29/22 1359   Temp: 97.1 °F (36.2 °C)   Weight: 54.4 kg (120 lb)   Height: 162.6 cm (64\")       General: A and O x 3, ASA, NAD    SCLERA:    Normal    Skin clear no unusual lesions noted  Right knee patient has no appreciable effusion she has severe valgus alignment with 116 degrees flexion neutral in extension with a positive Norma negative Lockman calf soft and nontender       Radiology:  Xrays 3views (ap,lateral, sunrise) right knee were ordered and reviewed today secondary to pain and show bone-on-bone end-stage osteoarthritis with severe malalignment.  Compared to views show definite progression in arthritic changes    Assessment/Plan: End-stage osteoarthritis right knee with severe malalignment    Patient and I discussed options, surgery no longer is an option for her.  We will try a right knee cortisone injection as well as physical therapy and see her back for follow-up in 3 months.    Large Joint Arthrocentesis: R knee  Date/Time: 3/29/2022 2:21 PM  Consent given by: patient  Site marked: site marked  Timeout: Immediately prior to procedure a time out was called to verify the correct patient, procedure, equipment, support staff and site/side marked as required   Supporting Documentation  Indications: pain and joint swelling   Procedure Details  Location: knee - R knee  Preparation: Patient was prepped and draped in the usual sterile fashion  Needle size: 22 G  Approach: anterolateral  Medications administered: 80 mg methylPREDNISolone acetate 80 MG/ML; 2 mL lidocaine PF 2% 2 %  Patient tolerance: patient tolerated the procedure well with no immediate complications        Elsa Vang, APRN  3/29/2022  "

## 2022-03-30 ENCOUNTER — HOME HEALTH ADMISSION (OUTPATIENT)
Dept: HOME HEALTH SERVICES | Facility: HOME HEALTHCARE | Age: 87
End: 2022-03-30

## 2022-03-30 ENCOUNTER — PATIENT ROUNDING (BHMG ONLY) (OUTPATIENT)
Dept: ORTHOPEDIC SURGERY | Facility: CLINIC | Age: 87
End: 2022-03-30

## 2022-03-30 NOTE — PROGRESS NOTES
A [employer] message has been sent to the patient for PATIENT ROUNDING with [facility]

## 2022-04-01 ENCOUNTER — HOME CARE VISIT (OUTPATIENT)
Dept: HOME HEALTH SERVICES | Facility: HOME HEALTHCARE | Age: 87
End: 2022-04-01

## 2022-04-01 ENCOUNTER — TELEPHONE (OUTPATIENT)
Dept: ORTHOPEDIC SURGERY | Facility: CLINIC | Age: 87
End: 2022-04-01

## 2022-04-01 DIAGNOSIS — M17.11 PRIMARY OSTEOARTHRITIS OF RIGHT KNEE: Primary | ICD-10-CM

## 2022-04-01 NOTE — HOME HEALTH
Went to patient's home and spoke with her about HH PT, patient decided that she did not want to be homebound per medicare guidelines.  Therapist and patient discussed other options that would benefit her and she decided to go to outpatient PT. Therapist contacted Carlos Richardson's office and had him put in a referral to [facility] outpatient PT.
yes

## 2022-04-01 NOTE — TELEPHONE ENCOUNTER
Patient would like to start out patient PT at [facility]. She has been doing home health.  Can you please put in a order.

## 2022-04-01 NOTE — CASE COMMUNICATION
Patient was not admitted to skilled PT due to patient not wanting to be homebound.  Therapist called Dr. Richardson and had him put orders in for outpatient PT

## 2022-08-16 ENCOUNTER — OFFICE (OUTPATIENT)
Dept: URBAN - METROPOLITAN AREA CLINIC 75 | Facility: CLINIC | Age: 87
End: 2022-08-16

## 2022-08-16 VITALS
SYSTOLIC BLOOD PRESSURE: 130 MMHG | OXYGEN SATURATION: 97 % | HEART RATE: 73 BPM | WEIGHT: 117 LBS | DIASTOLIC BLOOD PRESSURE: 82 MMHG | HEIGHT: 65 IN

## 2022-08-16 DIAGNOSIS — R19.7 DIARRHEA, UNSPECIFIED: ICD-10-CM

## 2022-08-16 DIAGNOSIS — R23.2 FLUSHING: ICD-10-CM

## 2022-08-16 DIAGNOSIS — Z85.060 PERSONAL HISTORY OF MALIGNANT CARCINOID TUMOR OF SMALL INTES: ICD-10-CM

## 2022-08-16 PROCEDURE — 99203 OFFICE O/P NEW LOW 30 MIN: CPT | Performed by: INTERNAL MEDICINE

## 2022-10-03 ENCOUNTER — OFFICE (OUTPATIENT)
Dept: URBAN - METROPOLITAN AREA CLINIC 75 | Facility: CLINIC | Age: 87
End: 2022-10-03

## 2022-10-03 VITALS
HEART RATE: 75 BPM | WEIGHT: 115 LBS | OXYGEN SATURATION: 98 % | HEIGHT: 65 IN | DIASTOLIC BLOOD PRESSURE: 86 MMHG | SYSTOLIC BLOOD PRESSURE: 128 MMHG

## 2022-10-03 DIAGNOSIS — R23.2 FLUSHING: ICD-10-CM

## 2022-10-03 DIAGNOSIS — R19.7 DIARRHEA, UNSPECIFIED: ICD-10-CM

## 2022-10-03 DIAGNOSIS — Z85.060 PERSONAL HISTORY OF MALIGNANT CARCINOID TUMOR OF SMALL INTES: ICD-10-CM

## 2022-10-03 PROCEDURE — 99214 OFFICE O/P EST MOD 30 MIN: CPT

## 2022-11-07 ENCOUNTER — OFFICE (OUTPATIENT)
Dept: URBAN - METROPOLITAN AREA CLINIC 76 | Facility: CLINIC | Age: 87
End: 2022-11-07

## 2022-11-07 DIAGNOSIS — R19.7 DIARRHEA, UNSPECIFIED: ICD-10-CM

## 2022-11-07 PROCEDURE — 99213 OFFICE O/P EST LOW 20 MIN: CPT

## 2023-01-09 VITALS
HEIGHT: 65 IN | DIASTOLIC BLOOD PRESSURE: 75 MMHG | SYSTOLIC BLOOD PRESSURE: 130 MMHG | HEART RATE: 63 BPM | OXYGEN SATURATION: 93 %

## 2023-03-15 ENCOUNTER — TELEPHONE (OUTPATIENT)
Dept: ONCOLOGY | Facility: CLINIC | Age: 88
End: 2023-03-15

## 2023-03-15 NOTE — TELEPHONE ENCOUNTER
Caller: Eneida Trevino    Relationship to patient: Self    Best call back number: [phone] OKAY TO CALL ANYTIME    Chief complaint: PT WOULD LIKE A LATER APPT IF POSSIBLE, DUE TO TRANSPORTATION ISSUES. OR IS THERE ANY SERVICE THAT SHE CAN GET TO GET TRANSPORTATION    PT REQUEST AVAILABLE TRANSPORTATION RESOURCES WITH OFFICE.    NO AVAILABLE APPTS WITHIN TIME FRAME.    Type of visit: NEW PT LAB AND NEW PT APPT.    If rescheduling, when is the original appointment: 03/21/23

## 2023-03-20 DIAGNOSIS — E34.0 CARCINOID SYNDROME: Primary | ICD-10-CM

## 2023-03-21 ENCOUNTER — TELEPHONE (OUTPATIENT)
Dept: OTHER | Facility: HOSPITAL | Age: 88
End: 2023-03-21
Payer: MEDICARE

## 2023-03-21 ENCOUNTER — LAB (OUTPATIENT)
Dept: LAB | Facility: HOSPITAL | Age: 88
End: 2023-03-21
Payer: MEDICARE

## 2023-03-21 ENCOUNTER — CONSULT (OUTPATIENT)
Dept: ONCOLOGY | Facility: CLINIC | Age: 88
End: 2023-03-21
Payer: MEDICARE

## 2023-03-21 VITALS
DIASTOLIC BLOOD PRESSURE: 73 MMHG | TEMPERATURE: 97.7 F | OXYGEN SATURATION: 96 % | HEIGHT: 64 IN | BODY MASS INDEX: 18.37 KG/M2 | WEIGHT: 107.6 LBS | RESPIRATION RATE: 16 BRPM | HEART RATE: 66 BPM | SYSTOLIC BLOOD PRESSURE: 147 MMHG

## 2023-03-21 DIAGNOSIS — C7A.012 MALIGNANT CARCINOID TUMOR OF ILEUM: Primary | ICD-10-CM

## 2023-03-21 DIAGNOSIS — E34.0 CARCINOID SYNDROME: ICD-10-CM

## 2023-03-21 DIAGNOSIS — R19.7 INTRACTABLE DIARRHEA: ICD-10-CM

## 2023-03-21 DIAGNOSIS — E53.8 VITAMIN B12 DEFICIENCY: ICD-10-CM

## 2023-03-21 LAB
ALBUMIN SERPL-MCNC: 4.7 G/DL (ref 3.5–5.2)
ALBUMIN/GLOB SERPL: 1.5 G/DL (ref 1.1–2.4)
ALP SERPL-CCNC: 104 U/L (ref 38–116)
ALT SERPL W P-5'-P-CCNC: 13 U/L (ref 0–33)
ANION GAP SERPL CALCULATED.3IONS-SCNC: 12.7 MMOL/L (ref 5–15)
AST SERPL-CCNC: 22 U/L (ref 0–32)
BASOPHILS # BLD AUTO: 0.04 10*3/MM3 (ref 0–0.2)
BASOPHILS NFR BLD AUTO: 0.7 % (ref 0–1.5)
BILIRUB SERPL-MCNC: 0.4 MG/DL (ref 0.2–1.2)
BUN SERPL-MCNC: 18 MG/DL (ref 6–20)
BUN/CREAT SERPL: 27.7 (ref 7.3–30)
CALCIUM SPEC-SCNC: 9.9 MG/DL (ref 8.5–10.2)
CHLORIDE SERPL-SCNC: 107 MMOL/L (ref 98–107)
CO2 SERPL-SCNC: 25.3 MMOL/L (ref 22–29)
CREAT SERPL-MCNC: 0.65 MG/DL (ref 0.6–1.1)
DEPRECATED RDW RBC AUTO: 45 FL (ref 37–54)
EGFRCR SERPLBLD CKD-EPI 2021: 84.3 ML/MIN/1.73
EOSINOPHIL # BLD AUTO: 0.1 10*3/MM3 (ref 0–0.4)
EOSINOPHIL NFR BLD AUTO: 1.6 % (ref 0.3–6.2)
ERYTHROCYTE [DISTWIDTH] IN BLOOD BY AUTOMATED COUNT: 13.9 % (ref 12.3–15.4)
GLOBULIN UR ELPH-MCNC: 3.1 GM/DL (ref 1.8–3.5)
GLUCOSE SERPL-MCNC: 97 MG/DL (ref 74–124)
HCT VFR BLD AUTO: 38.8 % (ref 34–46.6)
HGB BLD-MCNC: 12.4 G/DL (ref 12–15.9)
IMM GRANULOCYTES # BLD AUTO: 0.02 10*3/MM3 (ref 0–0.05)
IMM GRANULOCYTES NFR BLD AUTO: 0.3 % (ref 0–0.5)
LYMPHOCYTES # BLD AUTO: 1.33 10*3/MM3 (ref 0.7–3.1)
LYMPHOCYTES NFR BLD AUTO: 21.8 % (ref 19.6–45.3)
MCH RBC QN AUTO: 28.4 PG (ref 26.6–33)
MCHC RBC AUTO-ENTMCNC: 32 G/DL (ref 31.5–35.7)
MCV RBC AUTO: 88.8 FL (ref 79–97)
MONOCYTES # BLD AUTO: 0.7 10*3/MM3 (ref 0.1–0.9)
MONOCYTES NFR BLD AUTO: 11.5 % (ref 5–12)
NEUTROPHILS NFR BLD AUTO: 3.9 10*3/MM3 (ref 1.7–7)
NEUTROPHILS NFR BLD AUTO: 64.1 % (ref 42.7–76)
NRBC BLD AUTO-RTO: 0 /100 WBC (ref 0–0.2)
PLATELET # BLD AUTO: 211 10*3/MM3 (ref 140–450)
PMV BLD AUTO: 9.4 FL (ref 6–12)
POTASSIUM SERPL-SCNC: 3.9 MMOL/L (ref 3.5–4.7)
PROT SERPL-MCNC: 7.8 G/DL (ref 6.3–8)
RBC # BLD AUTO: 4.37 10*6/MM3 (ref 3.77–5.28)
SODIUM SERPL-SCNC: 145 MMOL/L (ref 134–145)
WBC NRBC COR # BLD: 6.09 10*3/MM3 (ref 3.4–10.8)

## 2023-03-21 PROCEDURE — 1126F AMNT PAIN NOTED NONE PRSNT: CPT | Performed by: INTERNAL MEDICINE

## 2023-03-21 PROCEDURE — 85025 COMPLETE CBC W/AUTO DIFF WBC: CPT

## 2023-03-21 PROCEDURE — 1160F RVW MEDS BY RX/DR IN RCRD: CPT | Performed by: INTERNAL MEDICINE

## 2023-03-21 PROCEDURE — 80053 COMPREHEN METABOLIC PANEL: CPT

## 2023-03-21 PROCEDURE — 1159F MED LIST DOCD IN RCRD: CPT | Performed by: INTERNAL MEDICINE

## 2023-03-21 PROCEDURE — 36415 COLL VENOUS BLD VENIPUNCTURE: CPT

## 2023-03-21 PROCEDURE — G2212 PROLONG OUTPT/OFFICE VIS: HCPCS | Performed by: INTERNAL MEDICINE

## 2023-03-21 PROCEDURE — 99205 OFFICE O/P NEW HI 60 MIN: CPT | Performed by: INTERNAL MEDICINE

## 2023-03-21 RX ORDER — POTASSIUM CHLORIDE 20 MEQ/1
20 TABLET, EXTENDED RELEASE ORAL
COMMUNITY
Start: 2023-02-21

## 2023-03-21 RX ORDER — FUROSEMIDE 40 MG/1
40 TABLET ORAL DAILY
COMMUNITY
Start: 2023-03-03

## 2023-03-21 NOTE — PROGRESS NOTES
Subjective     REASON FOR CONSULTATION:  Provide an opinion on any further workup or treatment on:    Carcinoid tumor of the small intestine  Recurrent diarrhea                       REQUESTING PHYSICIAN: Mike Collins MD    RECORDS OBTAINED: Records of the patients history including those obtained from the referring provider were reviewed and summarized in detail.    HISTORY OF PRESENT ILLNESS:    Eneida Trevino is a 89 y.o. patient who was referred for evaluation of carcinoid tumor and severe diarrhea.  She is being seen as a second opinion consultation referred by her PCP, Dr. Hodges and her oncologist, Dr. Collins.     Patient had a CT scan on 3/12/2015 that revealed a 3.6 x 2.6 cm right lower quadrant mesenteric mass, slightly larger than previous scan.  CT on 5/22/2015 showed the mass to be slightly larger measuring 3.4 x 3.2 cm.  Chromogranin A level was 109 on 5/23/2015.  On 5/22/2015, she underwent laparoscopic ileocolic resection.  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  There were 2 tumors in the specimen 1 measuring 2.4 cm and the second measuring 1 cm.  Lymph nodes were negative for metastasis.  The tumor invaded the mucosa submucosa and muscularis with focal penetration of the visceral peritoneum.  There was lymphovascular invasion.  Ki-67 was 2%.  This was considered T4, N0 M0.  After surgery, flushing resolved.  However, she had problem with diarrhea 10-15 bowel movements a day.    Patient had follow-up CT scans afterwards.  She also had follow-up labs and showed gradual increase in the chromogranin A and serotonin levels.  Serotonin level increased to 1013 on 8/2/2018.  It increased to 1000 303 on 7/1/2019.    Patient had a CT on 5/20/2020 that revealed pulmonary nodules which were reported as being stable.  Nodule in the right upper lobe measured 3 mm.  Subpleural right middle lobe nodule was 3 mm and a right middle lobe subpleural nodule  was 5 mm, all stable.  In the abdomen, there was enlargement of mesenteric lymph node measuring 12 x 9 mm compared to 8 x 6 mm.  She was continuing to have watery diarrhea and was losing weight.    Patient followed with Dr. Mcclendon until 2021 when she started seeing Dr. Carrington.  In August 2022, treatment with Sandostatin LAR was recommended.  Patient reports having vomiting and diarrhea after receiving Sandostatin.  She also reports that she had a feeling of weakness in the left arm at 1 point.  At another point, she had feeling of weakness in the right arm.  She also had a strange feeling of thinking that she was not wearing any clothes.  As a result of the symptoms, she decided not to continue with Sandostatin LAR.    Patient was given telotristat.  She took them for 10 days and felt that they were not effective.    Patient currently reports having diarrhea 10-15 episodes a day.  The diarrhea episodes are frequently very watery and she is afraid that she is going to have incontinence episodes if she goes outside her home.  She eats a good amount of food and feels that her food goes undigested.  She frequently sees food items that are in the bowel movements in an undigested form.  She has been taking Lomotil for total of 4 tablets a day.  She reports that she was tried on a powder likely cholestyramine but without much improvement.    Patient reports feeling fatigued.  She reports receiving that she was found to have vitamin B12 deficiency and she reports that she received vitamin B12 injections in the past.    REVIEW OF SYSTEMS:  Review of Systems   Constitutional: Positive for unexpected weight change. Negative for fatigue and fever.   HENT: Negative for nosebleeds and voice change.    Eyes: Negative for visual disturbance.   Respiratory: Negative for cough and shortness of breath.    Cardiovascular: Negative for chest pain and leg swelling.   Gastrointestinal: Positive for diarrhea. Negative for abdominal pain,  blood in stool, constipation, nausea and vomiting.   Endocrine: Positive for polyuria.   Genitourinary: Positive for frequency. Negative for hematuria.        History of kidney stones   Musculoskeletal: Positive for gait problem. Negative for back pain and joint swelling.   Skin: Negative for rash.   Neurological: Negative for dizziness and headaches.   Hematological: Negative for adenopathy. Does not bruise/bleed easily.   Psychiatric/Behavioral: Negative for dysphoric mood. The patient is not nervous/anxious.       Past Medical History:   Diagnosis Date   • Anxiety    • Arthritis    • Carcinoid tumor of gastrointestinal tract     SMALL INTESTINE/ SKIN CANCER BASEL CELL ON FACE   • GERD (gastroesophageal reflux disease)    • Hypertension    • Incontinence     URINE   • Incontinence of bowel    • Keloid scar     ABDOMEN   • Malignant carcinoid syndrome (HCC)    • Skin cancer     Left lower leg     Past Surgical History:   Procedure Laterality Date   • BREAST IMPLANT SURGERY     • BREAST SURGERY      AUGMENTATION   • CARDIAC CATHETERIZATION     • COLON SURGERY      resection small intestine   • COLONOSCOPY     • FOOT SURGERY Bilateral    • HYSTERECTOMY      MAGALY/BSO   • KIDNEY STONE SURGERY       Social History     Socioeconomic History   • Marital status:    • Number of children: 2   Tobacco Use   • Smoking status: Former     Years: 40.00     Types: Cigarettes     Quit date:      Years since quittin.2   • Smokeless tobacco: Never   Substance and Sexual Activity   • Alcohol use: No   • Drug use: Defer   • Sexual activity: Defer     Family History   Problem Relation Age of Onset   • Cancer Mother    • Hypertension Mother    • Heart disease Mother    • Heart disease Father    • Breast cancer Sister    • Cancer Brother    • Malig Hyperthermia Neg Hx       MEDICATIONS:    Current Outpatient Medications:   •  CloNIDine (CATAPRES) 0.2 MG tablet, Take 1 tablet by mouth Every Night. TAKES 2 TABLETS, Disp:  ", Rfl:   •  furosemide (LASIX) 40 MG tablet, Take 1 tablet by mouth Daily., Disp: , Rfl:   •  loperamide (IMODIUM A-D) 2 MG tablet, Take 1 tablet by mouth 4 (Four) Times a Day As Needed., Disp: , Rfl:   •  losartan (COZAAR) 50 MG tablet, Take 2 tablets by mouth Daily., Disp: , Rfl:   •  mupirocin (BACTROBAN) 2 % nasal ointment, into the nostril(s) as directed by provider. PER MD INSTRUCTIONS, Disp: , Rfl:   •  potassium chloride (K-DUR,KLOR-CON) 20 MEQ CR tablet, Take 1 tablet by mouth., Disp: , Rfl:   •  ciprofloxacin (CIPRO) 500 MG tablet, Take 1 tablet by mouth Every 12 (Twelve) Hours. (Patient not taking: Reported on 3/21/2023), Disp: 14 tablet, Rfl: 0     ALLERGIES:  Allergies   Allergen Reactions   • Cephalexin Itching   • Penicillins Other (See Comments)     UNKNOWN   • Sulfa Antibiotics Rash      Objective   VITAL SIGNS:  Vitals:    03/21/23 0812   BP: 147/73   Pulse: 66   Resp: 16   Temp: 97.7 °F (36.5 °C)   TempSrc: Temporal   SpO2: 96%   Weight: 48.8 kg (107 lb 9.6 oz)  Comment: AWARE OF WT LOSS/   Height: 162 cm (63.78\")  Comment: NEW HT   PainSc: 0-No pain     Wt Readings from Last 3 Encounters:   03/21/23 48.8 kg (107 lb 9.6 oz)   03/29/22 54.4 kg (120 lb)   08/14/18 53.7 kg (118 lb 6.4 oz)     PHYSICAL EXAMINATION  GENERAL:  The patient appears in fair general condition, not in acute distress.  SKIN: No skin rashes. No ecchymosis.  HEAD:  Normocephalic.  EYES:  No Jaundice. No Pallor. Pupils equal. EOMI.  NECK:  Supple with Good ROM. No Masses.  LYMPHATICS:  No cervical or supraclavicular or inguinal lymphadenopathy.  CHEST: Normal respiratory effort. Lungs clear to auscultation.   CARDIAC:  Normal S1 & S2. No murmur.   ABDOMEN:  Soft.  Mild tenderness. No Hepatomegaly. No Splenomegaly.  No definite masses.  EXTREMITIES: Trace edema.  No clubbing. No noted deformity.   NEUROLOGICAL:  No Focal neurological deficits.     RESULT REVIEW:   Results from last 7 days   Lab Units 03/21/23  0754   WBC 10*3/mm3 " 6.09   NEUTROS ABS 10*3/mm3 3.90   HEMOGLOBIN g/dL 12.4   HEMATOCRIT % 38.8   PLATELETS 10*3/mm3 211     Results from last 7 days   Lab Units 03/21/23  0754   SODIUM mmol/L 145   POTASSIUM mmol/L 3.9   CHLORIDE mmol/L 107   CO2 mmol/L 25.3   BUN mg/dL 18   CREATININE mg/dL 0.65   CALCIUM mg/dL 9.9   ALBUMIN g/dL 4.7   BILIRUBIN mg/dL 0.4   ALK PHOS U/L 104   ALT (SGPT) U/L 13   AST (SGOT) U/L 22      Lab Results   Component Value Date    LTYGBVMJ28 178 (L) 09/26/2022    HIKVHNVB22 323 07/13/2021    HLSODLIM19 291 07/14/2020     Serotonin was 1108 on 10/14/2020.  Serotonin was 1060 on 1/30/2023.    Chromogranin was 153 on 8/2/2018  Chromogranin A was 142 on 1/30/2023.    CT abdomen pelvis on 3/6/2023:  1. 3 mm distal right ureteral stone producing right hydronephrosis.   2. A 3 mm calyceal stones seen in the lower pole of the left kidney is no longer present. There continues to be mild left hydronephrosis without the distal obstructing stone.   3. Stable peritoneal nodules most concerning for metastasis. In the absence of an obstructing left ureteral stone, nonobstructing peritoneal mass involving the left ureter would be a diagnostic consideration for mild left hydronephrosis.     CT abdomen pelvis on 2/17/2023:  1. Multiple solid nodules are present involving the peritoneal serosal surfaces as well as the mesentery. Compared to previous examinations performed October 2022 as well as September 2020, these nodules have demonstrated slow interval growth over time,   consistent with slowly progressive metastatic disease.     Venous Doppler bilateral lower extremities on 2/16/2023:  Bilateral lower extremities without evidence of deep or superficial vein thrombus.   Incidental finding, lymph node right groin measuring 2.27.x 1.03 .   Incidental finding, popliteal cyst in the right  popliteal fossa measuring 1.85  x  .60 cm. Recommend clinical correlation.       Compared to previous examination  6/3/22  there has been no  significant change.     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  · CT scan on 3/12/2015 that revealed a 3.6 x 2.6 cm right lower quadrant mesenteric mass, slightly larger than previous scan.    · CT on 5/22/2015 showed the mass to be slightly larger measuring 3.4 x 3.2 cm.    · Chromogranin A level was 109 on 5/23/2015.    · On 5/22/2015, she underwent laparoscopic ileocolic resection.    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    · There were 2 tumors in the specimen, one measuring 2.4 cm and the second measuring 1 cm.    · Lymph nodes were negative for metastasis.    · The tumor invaded the mucosa submucosa and muscularis with focal penetration of the visceral peritoneum.    · There was lymphovascular invasion.    · Ki-67 was 2%.  This was considered T4, N0 M0.    · After surgery, flushing resolved.  However, she had problem with diarrhea 10-15 bowel movements a day.  · Patient had follow-up CT scans afterwards.  She also had follow-up labs and showed gradual increase in the chromogranin A and serotonin levels.    · Serotonin level increased to 1013 on 8/2/2018.  It increased to 1000 303 on 7/1/2019.  · CT on 5/20/2020 revealed stable pulmonary nodules.    · Nodule in the right upper lobe measured 3 mm.  Subpleural right middle lobe nodule was 3 mm and a right middle lobe subpleural nodule was 5 mm, all stable.   · In the abdomen, there was enlargement of mesenteric lymph node measuring 12 x 9 mm compared to 8 x 6 mm.    · She followed with Dr. Mcclendon until 2021.  · She saw Dr. Dr. Collins on 8/29/2022.  Treatment with Sandostatin LAR was recommended.    · Patient received 1 dose of Sandostatin LAR on 8/29/2022.  The dose of the medicine could not be determined on review of the medical record.    · She reports having vomiting and diarrhea after receiving Sandostatin.    · She also reports that she had a feeling of weakness in  the left arm on Monday.  On another day, she had feeling of weakness in the right arm.  She also had a strange feeling of thinking that she was not wearing any clothes.    · As a result of the symptoms, she decided not to continue with Sandostatin LAR.  · She was given telotristat samples.  She took them for 10 days and felt that they were not effective.  · CT on 2/17/2023 revealed multiple solid nodules in the peritoneal serosal surfaces and the mesentery.  · When compared to October 2022 in September 2020, the nodules demonstrated slow interval growth.  · This is consistent with the slow disease progression as treated with carcinoid syndrome.  · She was reported to have a right inguinal lymph node on venous Doppler on 2/16/2023 but a lymph node in the area was not palpable today.  · Serum Chromogranin A and serotonin levels were obtained today.  · I explained that since the carcinoid is progressive on CT and since she is symptomatic, recommended treatment rather than monitoring.  · I explained that the symptoms she developed after receiving the Sandostatin LAR in August 2022 are not common symptoms of this medicine.  · Given the fact that this is the most tolerated medicine in treatment of carcinoid tumors, I recommended retrying Sandostatin LAR at the lowest dose of 10 mg with close monitoring of her symptoms.  · Patient and family agreed with recommendation.    *Recurrent watery diarrhea secondary to carcinoid syndrome.    · She has 10-15 episodes a day.    · She has concern about possible stool incontinence if she goes outside her home.  · She has been taking Lomotil for total of 4 tablets a day.    · She reports that she was tried cholestyramine but without much improvement.    *Vitamin B12 deficiency.  · This is attributed to decreased absorption due to resection of the ileum.  · Vitamin B12 level was 178 on 9/26/2022.  · She received vitamin B12 injections in the past.    PLAN:    1.  Increase Lomotil to 2  tablets every 6 hours.  2.  If this is not adequate in controlling the diarrhea, she can take Imodium in between Lomotil to control diarrhea.  3.  I recommended restarting Sandostatin LAR at 10 mg IM monthly.  I explained that this is the lowest dose of this medicine and she would monitor for side effects after the injection.  4.  If she does not tolerate the lowest dose of Sandostatin, Afinitor can be considered.  However, it does have side effects of nausea, altered taste, mucositis and elevated blood pressure.  May need to use 5 mg dose.  5.  If she does not tolerate Afinitor, consider dotatate PET scan.  If positive, would consider Vanesa 177 Dotatate radioactive treatment.  We will  6.  I recommended continuing vitamin B12 injections monthly.    Patient will continue follow-up with Dr. Collins.  I did not schedule a follow-up visit at our office.    I spent 90 minutes caring for Eneida on this date of service. This time includes time spent by me in the following activities: preparing for the visit, reviewing tests, obtaining and/or reviewing a separately obtained history, performing a medically appropriate examination and/or evaluation, counseling and educating the patient/family/caregiver, ordering medications, tests, or procedures, documenting information in the medical record and independently interpreting results and communicating that information with the patient/family/caregiver       Ulises Daigle MD  03/21/23

## 2023-03-21 NOTE — TELEPHONE ENCOUNTER
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     Family Concerns:  None reported     Spiritual Concerns:  None reported      Interventions:     Emotional Needs: emotional suppport/coping strategies    Comments:  Spoke to the patient at [phone]; explained role of OSW and discussed patient’s distress screening score.  OSW actively listened as patient discussed her appreciation for a follow up call but stated that she gave herself a high distress score because of her having severe diarrhea yesterday and having to get up early today.  She states that she lives in a condo alone, has her sister to transport her to appointments if she is unable to drive herself and has both a cane and walker.  The patient states that she saw Dr. Daigle as a second opinion due to the request of her oncologist at [facility] Dr. Mike Collins.  The patient currently denies any supportive oncology needs and was informed that OSW contact information would be mailed to her for any future psychosocial needs that may arise if she follows back up with a [facility] oncologist.  She was also informed that she can request an oncology social worker through [facility] if she follows up with her [facility] oncologist.  MARRY Suero

## 2023-03-23 LAB — CGA SERPL-MCNC: 192.9 NG/ML (ref 0–101.8)

## 2023-03-24 LAB — SEROTONIN SER-MCNC: 1869 NG/ML (ref 8–217)

## 2023-09-18 ENCOUNTER — TELEPHONE (OUTPATIENT)
Dept: ONCOLOGY | Facility: CLINIC | Age: 88
End: 2023-09-18
Payer: MEDICARE

## 2023-09-18 NOTE — TELEPHONE ENCOUNTER
Caller: Avril Brandon    Relationship: Emergency Contact    Best call back number: [phone]     What is the best time to reach you: ANYTIME    Who are you requesting to speak with (clinical staff, provider,  specific staff member): CLINICAL    What was the call regarding: PT IS TAKING SHOT AND PT HAS HER SECOND SHOT. PT IS HAVING DIARRHEA ABOUT 15 TIMES A DAY, SINCE SHOT. PT IS DOWN ON HER WEIGHT.     WOULD LIKE DR LUNA TO TAKE A LOOK AT PT. IS THERE ANYTHING THE PT CAN TAKE FOR RELIEF? PT IS TAKING SOME MEDICATIONS FOR THIS AND NOTHING HAS HELPED SO FAR AND ONE IS A PRESCRIBED ONE.     Is it okay if the provider responds through [facility]: NO - PLEASE CALL BACK TO ADVISE AND POSSIBLY MAKE APPT.

## 2023-09-18 NOTE — TELEPHONE ENCOUNTER
Called the patient and she is taking lomotil and imodium and she is still have diarrhea up to 12 times a day. We only seen her for a second opinion and she is receiving the Sandostatin injections through Dr. Bobo office at [place] and I told her she needed to contact them to get in to be seen and she v/u.